# Patient Record
(demographics unavailable — no encounter records)

---

## 2018-01-17 NOTE — RAD
PA AND LATERAL CHEST X-RAY

1/17/18

 

HISTORY: 

Cough. 

 

COMPARISON:  

9/10/16.

 

FINDINGS:  

The cardiac silhouette and pulmonary vasculature are within normal limits. The lungs are clear. There
 has been no interval change compared to the prior exam. 

 

IMPRESSION:  

No acute cardiopulmonary process.

 

POS: H